# Patient Record
Sex: MALE | Race: WHITE | HISPANIC OR LATINO | Employment: FULL TIME | URBAN - METROPOLITAN AREA
[De-identification: names, ages, dates, MRNs, and addresses within clinical notes are randomized per-mention and may not be internally consistent; named-entity substitution may affect disease eponyms.]

---

## 2018-12-19 ENCOUNTER — HOSPITAL ENCOUNTER (EMERGENCY)
Facility: MEDICAL CENTER | Age: 23
End: 2018-12-19
Attending: EMERGENCY MEDICINE
Payer: COMMERCIAL

## 2018-12-19 ENCOUNTER — OCCUPATIONAL MEDICINE (OUTPATIENT)
Dept: URGENT CARE | Facility: CLINIC | Age: 23
End: 2018-12-19
Payer: COMMERCIAL

## 2018-12-19 VITALS
HEART RATE: 81 BPM | TEMPERATURE: 97.7 F | SYSTOLIC BLOOD PRESSURE: 146 MMHG | DIASTOLIC BLOOD PRESSURE: 81 MMHG | HEIGHT: 67 IN | WEIGHT: 210.32 LBS | RESPIRATION RATE: 16 BRPM | BODY MASS INDEX: 33.01 KG/M2 | OXYGEN SATURATION: 98 %

## 2018-12-19 VITALS
OXYGEN SATURATION: 98 % | TEMPERATURE: 98.8 F | RESPIRATION RATE: 16 BRPM | SYSTOLIC BLOOD PRESSURE: 132 MMHG | HEART RATE: 72 BPM | DIASTOLIC BLOOD PRESSURE: 94 MMHG

## 2018-12-19 DIAGNOSIS — T15.92XA FB EYE, LEFT, INITIAL ENCOUNTER: ICD-10-CM

## 2018-12-19 DIAGNOSIS — T15.92XA ACUTE FOREIGN BODY OF LEFT EYE, INITIAL ENCOUNTER: ICD-10-CM
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
## 2018-12-19 PROCEDURE — 99214 OFFICE O/P EST MOD 30 MIN: CPT | Mod: 29 | Performed by: NURSE PRACTITIONER

## 2018-12-19 PROCEDURE — 700101 HCHG RX REV CODE 250: Performed by: EMERGENCY MEDICINE

## 2018-12-19 PROCEDURE — 99284 EMERGENCY DEPT VISIT MOD MDM: CPT

## 2018-12-19 RX ORDER — TETRACAINE HYDROCHLORIDE 5 MG/ML
1 SOLUTION OPHTHALMIC ONCE
Status: COMPLETED | OUTPATIENT
Start: 2018-12-19 | End: 2018-12-19

## 2018-12-19 RX ORDER — ERYTHROMYCIN 5 MG/G
1 OINTMENT OPHTHALMIC 3 TIMES DAILY
Qty: 1 TUBE | Refills: 0 | Status: SHIPPED | OUTPATIENT
Start: 2018-12-19 | End: 2018-12-22

## 2018-12-19 RX ORDER — ERYTHROMYCIN 5 MG/G
OINTMENT OPHTHALMIC ONCE
Status: COMPLETED | OUTPATIENT
Start: 2018-12-19 | End: 2018-12-19

## 2018-12-19 RX ORDER — ERYTHROMYCIN 5 MG/G
1 OINTMENT OPHTHALMIC 3 TIMES DAILY
Qty: 1 TUBE | Refills: 0 | Status: SHIPPED | OUTPATIENT
Start: 2018-12-19 | End: 2018-12-19

## 2018-12-19 RX ADMIN — FLUORESCEIN SODIUM 0.6 MG: 0.6 STRIP OPHTHALMIC at 21:10

## 2018-12-19 RX ADMIN — ERYTHROMYCIN: 5 OINTMENT OPHTHALMIC at 21:14

## 2018-12-19 RX ADMIN — TETRACAINE HYDROCHLORIDE 1 DROP: 5 SOLUTION OPHTHALMIC at 21:10

## 2018-12-19 ASSESSMENT — ENCOUNTER SYMPTOMS
HEADACHES: 0
EYE REDNESS: 0
EYE PAIN: 0
PHOTOPHOBIA: 0
NECK PAIN: 0
EYE DISCHARGE: 0
NAUSEA: 0
DOUBLE VISION: 0
BLURRED VISION: 0

## 2018-12-19 ASSESSMENT — LIFESTYLE VARIABLES: DO YOU DRINK ALCOHOL: NO

## 2018-12-19 NOTE — LETTER
[facility], EMERGENCY DEPT   [address], [place] [address]  Phone: Dept: [phone] - Fax:        [facility] Progress Report and Disability Certification  Date of Service: 12/19/2018   No Show:  No  Date / Time of Next Visit:     Claim Information   Patient Name: Pacheco Hdz  Claim Number:     Employer: [employer]  Date of Injury:      Insurer / TPA:   ID / SSN: [id number]    Occupation:  Diagnosis: There were no encounter diagnoses.    Medical Information   Related to Industrial Injury?   ***   Subjective Complaints:      Objective Findings:     Pre-Existing Condition(s):     Assessment:        Status:    Permanent Disability:     Plan:      Diagnostics:      Comments:       Disability Information   Status:      From:     Through:   Restrictions are:     Physical Restrictions   Sitting:    Standing:    Stooping:    Bending:      Squatting:    Walking:    Climbing:    Pushing:      Pulling:    Other:    Reaching Above Shoulder (L):   Reaching Above Shoulder (R):       Reaching Below Shoulder (L):    Reaching Below Shoulder (R):      Not to exceed Weight Limits   Carrying(hrs):   Weight Limit(lb):   Lifting(hrs):   Weight  Limit(lb):     Comments:      Repetitive Actions   Hands: i.e. Fine Manipulations from Grasping:     Feet: i.e. Operating Foot Controls:     Driving / Operate Machinery:     Physician Name: Gemma Villarreal Physician Signature:   e-Signature:  , Medical Director   Clinic Name / Location: [facility], EMERGENCY DEPT  [address] [facility] [address] [phone]     Clinic Phone Number: Dept: [phone]   Appointment Time:  Visit Start Time:    Check-In Time:  4:59 PM Visit Discharge Time:    Original-Treating Physician or Chiropractor    Page 2-Insurer/TPA    Page 3-Employer    Page 4-Employee

## 2018-12-19 NOTE — LETTER
[facility], EMERGENCY DEPT   [address], [place] [address]  Phone: Dept: [phone] - Fax:        [facility] Progress Report and Disability Certification  Date of Service: 12/19/2018   No Show:  No  Date / Time of Next Visit:     Claim Information   Patient Name: Pacheco Hdz  Claim Number:     Employer: [employer]  Date of Injury: 12/19/2018     Insurer / TPA:   ID / SSN: [id number]    Occupation: Labor Diagnosis: The encounter diagnosis was Acute foreign body of left eye, initial encounter.    Medical Information   Related to Industrial Injury?   ***   Subjective Complaints:      Objective Findings:     Pre-Existing Condition(s):     Assessment:        Status:    Permanent Disability:     Plan:      Diagnostics:      Comments:       Disability Information   Status:      From:     Through:   Restrictions are:     Physical Restrictions   Sitting:    Standing:    Stooping:    Bending:      Squatting:    Walking:    Climbing:    Pushing:      Pulling:    Other:    Reaching Above Shoulder (L):   Reaching Above Shoulder (R):       Reaching Below Shoulder (L):    Reaching Below Shoulder (R):      Not to exceed Weight Limits   Carrying(hrs):   Weight Limit(lb):   Lifting(hrs):   Weight  Limit(lb):     Comments:      Repetitive Actions   Hands: i.e. Fine Manipulations from Grasping:     Feet: i.e. Operating Foot Controls:     Driving / Operate Machinery:     Physician Name: Gemma Villarreal Physician Signature:   e-Signature:  , Medical Director   Clinic Name / Location: [facility], EMERGENCY DEPT  [address] [place] [id number] [phone]     Clinic Phone Number: Dept: [phone]   Appointment Time:  Visit Start Time:    Check-In Time:  4:59 PM Visit Discharge Time:    Original-Treating Physician or Chiropractor    Page 2-Insurer/TPA    Page 3-Employer    Page 4-Employee

## 2018-12-19 NOTE — LETTER
[facility], EMERGENCY DEPT   [address] [place], [place] [address]  Phone: Dept: [phone] - Fax:        [facility] Progress Report and Disability Certification  Date of Service: 12/19/2018   No Show:  No  Date / Time of Next Visit:     Claim Information   Patient Name: Pacheco Hdz  Claim Number:     Employer: [employer]  Date of Injury: 12/19/2018     Insurer / TPA:   ID / SSN: [id number]    Occupation: Labor Diagnosis: The encounter diagnosis was Acute foreign body of left eye, initial encounter.    Medical Information   Related to Industrial Injury?   ***   Subjective Complaints:      Objective Findings:     Pre-Existing Condition(s):     Assessment:        Status:    Permanent Disability:     Plan:      Diagnostics:      Comments:       Disability Information   Status:      From:     Through:   Restrictions are:     Physical Restrictions   Sitting:    Standing:    Stooping:    Bending:      Squatting:    Walking:    Climbing:    Pushing:      Pulling:    Other:    Reaching Above Shoulder (L):   Reaching Above Shoulder (R):       Reaching Below Shoulder (L):    Reaching Below Shoulder (R):      Not to exceed Weight Limits   Carrying(hrs):   Weight Limit(lb):   Lifting(hrs):   Weight  Limit(lb):     Comments:      Repetitive Actions   Hands: i.e. Fine Manipulations from Grasping:     Feet: i.e. Operating Foot Controls:     Driving / Operate Machinery:     Physician Name: Gemma Villarreal Physician Signature:   e-Signature:  , Medical Director   Clinic Name / Location: [facility], EMERGENCY DEPT  [address] [place] [id number] [phone]     Clinic Phone Number: Dept: [phone]   Appointment Time:  Visit Start Time:    Check-In Time:  4:59 PM Visit Discharge Time:    Original-Treating Physician or Chiropractor    Page 2-Insurer/TPA    Page 3-Employer    Page 4-Employee

## 2018-12-19 NOTE — LETTER
EMPLOYEE’S CLAIM FOR COMPENSATION/ REPORT OF INITIAL TREATMENT  FORM C-4    EMPLOYEE’S CLAIM - PROVIDE ALL INFORMATION REQUESTED   First Name  Pacheco Last Name  Wilder Birthdate                    1995                Sex  male Claim Number   Home Address  [address] Age  23 y.o. 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 (Be specific an answer in detail. Use additional sheet if necessary)  While polishing metal resedue jumped up and hit the eye.   If you believe that you have an occupational disease, when did you first have knowledge of the disability and it relationship to your employment?  na Witnesses to the Accident  no one      Nature of Injury or Occupational Disease  Defer  Part(s) of Body Injured or Affected  Eye (L), ,     I certify that the above is true and correct to the best of my knowledge and that I have provided this information in order to obtain the benefits of [place] or [place]).  I hereby authorize any physician, chiropractor, surgeon, practitioner, or other person, any hospital, including [facility] Health system hospital, any medical service organization, any insurance company, or other institution or organization to release to each other, any medical or other information, including benefits paid or payable, pertinent to this injury or disease, except information relative to diagnosis, treatment and/or counseling for AIDS, psychological conditions, alcohol or controlled substances, for which I must give specific authorization.  A Photostat of this authorization shall be as valid as the original.     Date   Place   Employee’s Signature   THIS REPORT MUST BE COMPLETED AND MAILED WITHIN 3 WORKING DAYS OF TREATMENT   Place  [facility]  Name of Facility  [facility]   Date  12/19/2018 Diagnosis  (T15.92XA) FB eye, left, initial encounter Is there evidence the injured employee was under the influence of alcohol and/or another controlled substance at the time of accident?   Hour  3:56 PM Description of Injury or Disease  The encounter diagnosis was FB eye, left, initial encounter. No   Treatment  To ER for further evaluation and management.   Have you advised the patient to remain off work five days or more? No   X-Ray Findings      If Yes   From Date  To Date      From information given by the employee, together with medical evidence, can you directly connect this injury or occupational disease as job incurred?  Yes If No Full Duty  No Modified Duty  Yes   Is additional medical care by a physician indicated?  Yes If Modified Duty, Specify any Limitations / Restrictions  To be determined by ER physcian after evaluation   Do you know of any previous injury or disease contributing to this condition or occupational disease?                            No   Date  12/19/2018 Print Doctor’s Name Alisson [place]  "VIK Delgado I certify the employer’s copy of  this form was mailed on:   Address  [address] Insurer’s Use Only     [place] Zip  [address]    Provider’s Tax ID Number  [id number] Telephone  Dept: [phone]        [id number] Swenson   e-Signature: Dr. Fady Roth, Medical Director Degree  APN        ORIGINAL-TREATING PHYSICIAN OR CHIROPRACTOR    PAGE 2-INSURER/TPA    PAGE 3-EMPLOYER    PAGE 4-EMPLOYEE             Form C-4 (rev10/07)              BRIEF DESCRIPTION OF RIGHTS AND BENEFITS  (Pursuant to [place] 616C.050)    Notice of Injury or Occupational Disease (Incident Report Form C-1): If an injury or occupational disease (OD) arises out of and in the  course of employment, you must provide written notice to your employer as soon as practicable, but no later than 7 days after the accident or  OD. Your employer shall maintain a sufficient supply of the required forms.    Claim for Compensation (Form C-4): If medical treatment is sought, the form C-4 is available at the place of initial treatment. A completed  \"Claim for Compensation\" (Form C-4) must be filed within 90 days after an accident or OD. The treating physician or chiropractor must,  within 3 working days after treatment, complete and mail to the employer, the employer's insurer and third-party , the Claim for  Compensation.    Medical Treatment: If you require medical treatment for your on-the-job injury or OD, you may be required to select a physician or  chiropractor from a list provided by your workers’ compensation insurer, if it has contracted with an Organization for Managed Care (MCO) or  Preferred Provider Organization (PPO) or providers of health care. If your employer has not entered into a contract with an MCO or PPO, you  may select a physician or chiropractor from the Panel of Physicians and Chiropractors. Any medical costs related to your industrial injury or  OD " will be paid by your insurer.    Temporary Total Disability (TTD): If your doctor has certified that you are unable to work for a period of at least 5 consecutive days, or 5  cumulative days in a 20-day period, or places restrictions on you that your employer does not accommodate, you may be entitled to TTD  compensation.    Temporary Partial Disability (TPD): If the wage you receive upon reemployment is less than the compensation for TTD to which you are  entitled, the insurer may be required to pay you TPD compensation to make up the difference. TPD can only be paid for a maximum of 24  months.    Permanent Partial Disability (PPD): When your medical condition is stable and there is an indication of a PPD as a result of your injury or  OD, within 30 days, your insurer must arrange for an evaluation by a rating physician or chiropractor to determine the degree of your PPD. The  amount of your PPD award depends on the date of injury, the results of the PPD evaluation and your age and wage.    Permanent Total Disability (PTD): If you are medically certified by a treating physician or chiropractor as permanently and totally disabled  and have been granted a PTD status by your insurer, you are entitled to receive monthly benefits not to exceed 66 2/3% of your average  monthly wage. The amount of your PTD payments is subject to reduction if you previously received a PPD award.    Vocational Rehabilitation Services: You may be eligible for vocational rehabilitation services if you are unable to return to the job due to a  permanent physical impairment or permanent restrictions as a result of your injury or occupational disease.    Transportation and Per Edilia Reimbursement: You may be eligible for travel expenses and per edilia associated with medical treatment.    Reopening: You may be able to reopen your claim if your condition worsens after claim closure.    Appeal Process: If you disagree with a written determination  issued by the insurer or the insurer does not respond to your request, you may  appeal to the Department of Administration, , by following the instructions contained in your determination letter. You must  appeal the determination within 70 days from the date of the determination letter at [address], [address], [place], [place]  [address], or [address], [address], [place], [place] [address]. If you disagree with the  decision, you may appeal to the  Department of Administration, . You must file your appeal within 30 days from the date of the  decision  letter at [address], [address], [place], [place] [address], or [address], [address], [place], [place] [address]. If you  disagree with a decision of an , you may file a petition for judicial review with the District Court. You must do so within 30  days of the Appeal Officer’s decision. You may be represented by an  at your own expense or you may contact the [facility] for possible  representation.    [place]  for Injured Workers (NAIW): If you disagree with a  decision, you may request that NAIW represent you  without charge at an  Hearing. For information regarding denial of benefits, you may contact the [facility] at: [address], [address], [place] [address], [phone], or [address], [place] [address], [phone]    To File a Complaint with the Division: If you wish to file a complaint with the  of the Division of Industrial Relations (DIR),  please contact the Workers’ Compensation Section, [address], [address], [place], [place] [address], telephone ([phone]) [phone], or  [address], [place] [address], telephone [phone].    For assistance with Workers’ Compensation Issues: you may contact the Office of the  [facility] Consumer Health Assistance, [address], [address], [place] [address], Toll Free 1-331.588.6762, Web site: http://govcha.[facility].nv., E-mail  [email or web address].                                                                                                                                                                                                                                   __________________________________________________________________                                                                   _________________                Employee Name / Signature                                                                                                                                                       Date                                                                                                                                                                                                     D-2 (rev. 10/07)

## 2018-12-19 NOTE — LETTER
[facility] [address] [place] [address]  Phone:  [phone] - Fax:  [phone]   [facility] Progress Report and Disability Certification  Date of Service: 12/19/2018   No Show:  No  Date / Time of Next Visit:     Claim Information   Patient Name: Pacheco Hdz  Claim Number:     Employer: [employer]  Date of Injury: 12/19/2018     Insurer / TPA: [employer] [place]  ID / SSN:     Occupation: Labor  Diagnosis: The encounter diagnosis was FB eye, left, initial encounter.    Medical Information   Related to Industrial Injury? Yes    Subjective Complaints:  DOI 12/19/18 @ 11:30. He was polishing a metal hollis with a  brush. The metal particles came up and his his eye. He had a sensation of something in his eye. He used OTC eye drops in Left eye. The eye drops helped remove some of the flakes. He still has the sensation that something is in his eye. He can see it in his vision field.  Pain 0/10. Vision is normal. He does not wear contacts or glasses. He was wearing his safety googles at the time of the incident.  He does not have any other employers.    Objective Findings: COURTNEY. EOM intact. Visual acuity intact 20/15 OU. There is a small metal FB at medial aspect of pupil in cornea near edge of pupil.    Pre-Existing Condition(s):     Assessment:   Initial Visit    Status: Additional Care Required  Comments:TO ER for further evalatuion and management  Permanent Disability:No    Plan:      Diagnostics:      Comments:       Disability Information   Status:   Comments:to be determined by ER     From:     Through:   Restrictions are:     Physical Restrictions   Sitting:    Standing:    Stooping:    Bending:      Squatting:    Walking:    Climbing:    Pushing:      Pulling:    Other:    Reaching Above Shoulder (L):   Reaching Above Shoulder (R):       Reaching Below Shoulder (L):    Reaching Below Shoulder (R):      Not to exceed Weight  Limits   Carrying(hrs):   Weight Limit(lb):   Lifting(hrs):   Weight  Limit(lb):     Comments:      Repetitive Actions   Hands: i.e. Fine Manipulations from Grasping:     Feet: i.e. Operating Foot Controls:     Driving / Operate Machinery:     Physician Name: VIK Giordano Physician Signature: NARCISO Greer e-Signature: Dr. Fady Roth, Medical Director   Clinic Name / Location: [address] [address] Clinic Phone Number: Dept: [phone]   Appointment Time: 3:00 Pm Visit Start Time: 3:56 PM   Check-In Time:  3:12 Pm Visit Discharge Time:  4:49 PM   Original-Treating Physician or Chiropractor    Page 2-Insurer/TPA    Page 3-Employer    Page 4-Employee

## 2018-12-19 NOTE — LETTER
[facility], EMERGENCY DEPT   [address], [place] [address]  Phone: Dept: [phone] - Fax:        [facility] Progress Report and Disability Certification  Date of Service: 12/19/2018   No Show:  No  Date / Time of Next Visit:     Claim Information   Patient Name: Pacheco Hdz  Claim Number:     Employer: [employer]  Date of Injury: 12/19/2018     Insurer / TPA:  [employer] ID / SSN:    Occupation: Labor Diagnosis: The encounter diagnosis was Acute foreign body of left eye, initial encounter.    Medical Information   Related to Industrial Injury? Yes ***   Subjective Complaints:      Objective Findings: Foreign body in left eye   Pre-Existing Condition(s):     Assessment:   Initial Visit    Status: Additional Care Required  Permanent Disability:  Comments:follow up at occupational health    Plan: Consultation    Diagnostics:   Comments:none    Comments:       Disability Information   Status:   Comments:To be determined by occupational health    From:     Through:   Restrictions are:     Physical Restrictions   Sitting:    Standing:    Stooping:    Bending:      Squatting:    Walking:    Climbing:    Pushing:      Pulling:    Other:    Reaching Above Shoulder (L):   Reaching Above Shoulder (R):       Reaching Below Shoulder (L):    Reaching Below Shoulder (R):      Not to exceed Weight Limits   Carrying(hrs):   Weight Limit(lb):   Lifting(hrs):   Weight  Limit(lb):     Comments:      Repetitive Actions   Hands: i.e. Fine Manipulations from Grasping:     Feet: i.e. Operating Foot Controls:     Driving / Operate Machinery:     Physician Name: Julianne Villarreal Physician Signature: JULIANNE Casillas M.D. e-Signature:  , Medical Director   Clinic Name / Location: [facility], EMERGENCY DEPT  [address] [facility] [address] [facility] [address]  [phone]     Clinic Phone Number: Dept:  [phone]   Appointment Time:  Visit Start Time:    Check-In Time:  4:59 PM Visit Discharge Time:    Original-Treating Physician or Chiropractor    Page 2-Insurer/TPA    Page 3-Employer    Page 4-Employee

## 2018-12-20 NOTE — ED TRIAGE NOTES
"22 y/o male ambulatory to triage with c/o metal foreign body to the left eye. Pt states this occurred today while at work. Pt denies pain, he states there is \"a little\" blurred vision in the eye. Sent by urgent care.   "

## 2018-12-20 NOTE — ED PROVIDER NOTES
"ED Provider Note    Chief Complaint:   Foreign body in the eye    HPI:  Pacheco Hdz is a 23 y.o. male who presents with chief complaint of left eye foreign body.  His symptoms began to 3 hours prior to arrival.  He was using a  when he felt a small flake of metal fly into his eye.  He can see the small piece of metal and describe some associated eye irritation.  He has no vision changes, no other injuries today.  This did occur at work.  He reports no other concerns at this time.    Review of Systems:  See HPI for pertinent positives and negatives.    Past Medical History:       Social History:  Social History     Social History Main Topics   • Smoking status: Never Smoker   • Smokeless tobacco: Never Used   • Alcohol use Not on file   • Drug use: Unknown   • Sexual activity: Not on file       Surgical History:  patient denies any surgical history    Current Medications:  Home Medications    **Home medications have not yet been reviewed for this encounter**         Allergies:  No Known Allergies    Physical Exam:  Vital Signs: /81   Pulse 81   Temp 36.5 °C (97.7 °F) (Temporal)   Resp 16   Ht 1.702 m (5' 7\")   Wt 95.4 kg (210 lb 5.1 oz)   SpO2 98%   BMI 32.94 kg/m²   Constitutional: Alert, no acute distress  HENT: Atraumatic  Eyes: Right eye is normal-appearing, left eye with very mildly reddened conjunctiva, punctate metal flake seen in the 3 o'clock position.  Pulmonary: No respiratory distress, normal work of breathing  Skin: Warm, dry, no rashes or lesions, no periorbital edema  Neurologic: Alert, oriented, normal speech, normal motor function  Psychiatric: Normal and appropriate mood and affect    Medical records reviewed for continuity of care.  No recent visits for similar symptoms.    ED Medications Administered:  Medications   tetracaine (PONTOCAINE) 0.5 % ophthalmic solution 1 Drop (1 Drop Left Eye Given 12/19/18 2110)   erythromycin ophthalmic ointment ( Left Eye Given " 12/19/18 2114)   fluorescein ophthalmic strip 0.6 mg (0.6 mg Both Eyes Given 12/19/18 2110)       MDM:  Patient presents with small metallic foreign body in the left eye.  Eye was anesthetized using tetracaine drops.  Despite good anesthesia, patient is unable to remain still for foreign body removal, continually looking away when I approach the eye with any type of instrument.  Additionally, he backs away from the slit lamp when I approach.  I made multiple attempts at removing this foreign body, ultimately I was unable to get near the eye for removal as the patient states he is just not able to remain still.  Plan at this time is for discharge home.  He is given follow-up information for Dr. Parr, ophthalmologist on call this evening.  He will follow-up in clinic tomorrow for foreign body removal.  Additionally, he is instructed to follow-up with occupational health for work restrictions.  If he is not able to follow-up tomorrow for foreign body removal, he will return to the emergency department during business hours for assistance with ophthalmology follow-up appointment.  Return precautions given including development of worsening pain, vision changes, drainage or discharge from the eye, or any further concerns.    7:48 PM Awaiting medications    Blood pressure today is greater than 120/80, patient is instructed to follow up with primary care provider for blood pressure recheck.    Disposition:  Discharged home in stable condition    Final Impression:  1. Acute foreign body of left eye, initial encounter        Electronically signed by: Gemma Villarreal, 12/19/2018 12:40 AM

## 2018-12-20 NOTE — DISCHARGE INSTRUCTIONS
Please follow-up with the ophthalmologist, call tomorrow morning to schedule that appointment.  Return to the emergency department tomorrow morning if you are not able to follow-up with ophthalmology or occupational health.  Please follow-up with occupational health tomorrow for complete recheck.  Return immediately if you develop new or worsening symptoms including worsening pain, redness, drainage from the eye, or any further concerns.

## 2018-12-20 NOTE — ED NOTES
This RN brought patient back from waiting room. Pt is alert, oriented and ambulatory with steady gait. Pt accompanied by girlfriend.

## 2018-12-20 NOTE — PROGRESS NOTES
Subjective:      Pacheco Hdz is a 23 y.o. male who presents with Eye Injury (left eye, wire went into his left eye. NO blurry vision. )      Denies past medical, surgical or family history that is significant to today's problem.   RX or OTC medications reviewed with patient today.   No Known Allergies         HPI DOI 12/19/18 @ 11:30. He was polishing a metal hollis with a  brush. The metal particles came up and his his eye. He had a sensation of something in his eye. He used OTC eye drops in Left eye. The eye drops helped remove some of the flakes. He still has the sensation that something is in his eye. He can see it in his vision field.  Pain 0/10. Vision is normal. He does not wear contacts or glasses. He was wearing his safety googles at the time of the incident.  He does not have any other employers.     Review of Systems   Eyes: Negative for blurred vision, double vision, photophobia, pain, discharge and redness.   Gastrointestinal: Negative for nausea.   Musculoskeletal: Negative for neck pain.   Neurological: Negative for headaches.          Objective:     /94   Pulse 72   Temp 37.1 °C (98.8 °F) (Temporal)   Resp 16   SpO2 98%      Physical Exam   Constitutional: Vital signs are normal. He appears well-developed and well-nourished. He is cooperative.  Non-toxic appearance. No distress.   HENT:   Head: Normocephalic.   Eyes: Pupils are equal, round, and reactive to light. EOM and lids are normal. Lids are everted and swept, no foreign bodies found. Right eye exhibits discharge. Right conjunctiva is injected.       Cardiovascular: Normal rate and regular rhythm.    Pulmonary/Chest: Effort normal. No respiratory distress.   Neurological: He is alert.   Skin: Skin is warm and dry.   Psychiatric: He has a normal mood and affect. His speech is normal and behavior is normal.   Nursing note and vitals reviewed.      COURTNEY. EOM intact. Visual acuity intact 20/15 OU. There is a small metal FB at  medial aspect of pupil in cornea near edge of pupil.        Assessment/Plan:     1. FB eye, left, initial encounter      2. Work related injury      [facility] called to arrange transfer to higher level of care. Report given to dr. franz  Pt is to be transported via pvt car.   I have reiterated to patient that although a provider to provider transfer was made this will not necessarily expedite the ER process

## 2019-01-22 ENCOUNTER — APPOINTMENT (OUTPATIENT)
Dept: RADIOLOGY | Facility: MEDICAL CENTER | Age: 24
End: 2019-01-22
Attending: EMERGENCY MEDICINE
Payer: OTHER MISCELLANEOUS

## 2019-01-22 ENCOUNTER — HOSPITAL ENCOUNTER (EMERGENCY)
Facility: MEDICAL CENTER | Age: 24
End: 2019-01-22
Attending: EMERGENCY MEDICINE
Payer: OTHER MISCELLANEOUS

## 2019-01-22 ENCOUNTER — NON-PROVIDER VISIT (OUTPATIENT)
Dept: OCCUPATIONAL MEDICINE | Facility: CLINIC | Age: 24
End: 2019-01-22
Payer: OTHER MISCELLANEOUS

## 2019-01-22 VITALS
RESPIRATION RATE: 16 BRPM | TEMPERATURE: 98.4 F | HEIGHT: 67 IN | BODY MASS INDEX: 33.32 KG/M2 | HEART RATE: 75 BPM | WEIGHT: 212.3 LBS | SYSTOLIC BLOOD PRESSURE: 118 MMHG | OXYGEN SATURATION: 95 % | DIASTOLIC BLOOD PRESSURE: 80 MMHG

## 2019-01-22 DIAGNOSIS — T14.8XXA PUNCTURE WOUND: ICD-10-CM

## 2019-01-22 DIAGNOSIS — Z02.83 ENCOUNTER FOR DRUG SCREENING: ICD-10-CM

## 2019-01-22 DIAGNOSIS — M79.5 FOREIGN BODY (FB) IN SOFT TISSUE: ICD-10-CM

## 2019-01-22 DIAGNOSIS — Z02.1 PRE-EMPLOYMENT DRUG SCREENING: ICD-10-CM

## 2019-01-22 LAB
AMP AMPHETAMINE: NORMAL
BREATH ALCOHOL COMMENT: NORMAL
COC COCAINE: NORMAL
INT CON NEG: NORMAL
INT CON POS: NORMAL
MET METHAMPHETAMINES: NORMAL
OPI OPIATES: NORMAL
PCP PHENCYCLIDINE: NORMAL
POC BREATHALIZER: 0 PERCENT (ref 0–0.01)
POC DRUG COMMENT 753798-OCCUPATIONAL HEALTH: NEGATIVE
THC: NORMAL

## 2019-01-22 PROCEDURE — 304217 HCHG IRRIGATION SYSTEM

## 2019-01-22 PROCEDURE — 96365 THER/PROPH/DIAG IV INF INIT: CPT

## 2019-01-22 PROCEDURE — 10120 INC&RMVL FB SUBQ TISS SMPL: CPT

## 2019-01-22 PROCEDURE — 73130 X-RAY EXAM OF HAND: CPT | Mod: RT

## 2019-01-22 PROCEDURE — 80305 DRUG TEST PRSMV DIR OPT OBS: CPT | Performed by: INTERNAL MEDICINE

## 2019-01-22 PROCEDURE — 96375 TX/PRO/DX INJ NEW DRUG ADDON: CPT

## 2019-01-22 PROCEDURE — 82075 ASSAY OF BREATH ETHANOL: CPT | Performed by: INTERNAL MEDICINE

## 2019-01-22 PROCEDURE — 700105 HCHG RX REV CODE 258: Performed by: EMERGENCY MEDICINE

## 2019-01-22 PROCEDURE — 700111 HCHG RX REV CODE 636 W/ 250 OVERRIDE (IP)

## 2019-01-22 PROCEDURE — 99026 IN-HOSPITAL ON CALL SERVICE: CPT | Performed by: INTERNAL MEDICINE

## 2019-01-22 PROCEDURE — 700111 HCHG RX REV CODE 636 W/ 250 OVERRIDE (IP): Performed by: EMERGENCY MEDICINE

## 2019-01-22 PROCEDURE — 99285 EMERGENCY DEPT VISIT HI MDM: CPT

## 2019-01-22 PROCEDURE — 94770 HCHG CO2 EXPIRED GAS DETERMINATION: CPT

## 2019-01-22 PROCEDURE — 700101 HCHG RX REV CODE 250: Performed by: EMERGENCY MEDICINE

## 2019-01-22 RX ORDER — LIDOCAINE HYDROCHLORIDE 10 MG/ML
20 INJECTION, SOLUTION INFILTRATION; PERINEURAL ONCE
Status: COMPLETED | OUTPATIENT
Start: 2019-01-22 | End: 2019-01-22

## 2019-01-22 RX ORDER — PROPOFOL 10 MG/ML
INJECTION, EMULSION INTRAVENOUS
Status: COMPLETED | OUTPATIENT
Start: 2019-01-22 | End: 2019-01-22

## 2019-01-22 RX ORDER — SODIUM CHLORIDE 9 MG/ML
INJECTION, SOLUTION INTRAVENOUS
Status: COMPLETED | OUTPATIENT
Start: 2019-01-22 | End: 2019-01-22

## 2019-01-22 RX ORDER — OXYCODONE HYDROCHLORIDE AND ACETAMINOPHEN 5; 325 MG/1; MG/1
1-2 TABLET ORAL EVERY 6 HOURS PRN
Qty: 15 TAB | Refills: 0 | Status: SHIPPED | OUTPATIENT
Start: 2019-01-22 | End: 2019-01-25

## 2019-01-22 RX ORDER — CEPHALEXIN 500 MG/1
500 CAPSULE ORAL 4 TIMES DAILY
Qty: 28 CAP | Refills: 0 | Status: SHIPPED | OUTPATIENT
Start: 2019-01-22 | End: 2019-01-29

## 2019-01-22 RX ORDER — CEFAZOLIN SODIUM 1 G/50ML
1 INJECTION, SOLUTION INTRAVENOUS ONCE
Status: COMPLETED | OUTPATIENT
Start: 2019-01-22 | End: 2019-01-22

## 2019-01-22 RX ADMIN — FENTANYL CITRATE 50 MCG: 50 INJECTION, SOLUTION INTRAMUSCULAR; INTRAVENOUS at 13:05

## 2019-01-22 RX ADMIN — CEFAZOLIN SODIUM 1 G: 1 INJECTION, SOLUTION INTRAVENOUS at 14:01

## 2019-01-22 RX ADMIN — PROPOFOL 60 MG: 10 INJECTION, EMULSION INTRAVENOUS at 13:38

## 2019-01-22 RX ADMIN — PROPOFOL 20 MG: 10 INJECTION, EMULSION INTRAVENOUS at 13:44

## 2019-01-22 RX ADMIN — PROPOFOL 40 MG: 10 INJECTION, EMULSION INTRAVENOUS at 13:43

## 2019-01-22 RX ADMIN — SODIUM CHLORIDE 1000 ML: 9 INJECTION, SOLUTION INTRAVENOUS at 13:37

## 2019-01-22 RX ADMIN — LIDOCAINE HYDROCHLORIDE 20 ML: 10 INJECTION, SOLUTION INFILTRATION; PERINEURAL at 12:26

## 2019-01-22 RX ADMIN — PROPOFOL 40 MG: 10 INJECTION, EMULSION INTRAVENOUS at 13:39

## 2019-01-22 RX ADMIN — FENTANYL CITRATE 100 MCG: 50 INJECTION, SOLUTION INTRAMUSCULAR; INTRAVENOUS at 12:40

## 2019-01-22 RX ADMIN — PROPOFOL 20 MG: 10 INJECTION, EMULSION INTRAVENOUS at 13:41

## 2019-01-22 ASSESSMENT — PAIN SCALES - GENERAL
PAINLEVEL_OUTOF10: 6
PAINLEVEL_OUTOF10: 10

## 2019-01-22 NOTE — LETTER
"  FORM C-4:  EMPLOYEE’S CLAIM FOR COMPENSATION/ REPORT OF INITIAL TREATMENT  EMPLOYEE’S CLAIM - PROVIDE ALL INFORMATION REQUESTED   First Name  Pacheco Last Name  Wilder Birthdate             Age  1995 23 y.o. Sex  male Claim Number   Home Employee Address  [address]  [place]                                     [place]  [address] Height  1.702 m (5' 7\") Weight  96.3 kg (212 lb 4.9 oz) [facility]     Mailing Employee Address                           [address]   [place]               Zip  [address] Telephone  [phone] (home)  Primary Language Spoken  ENGLISH   Insurer   Third Party   [employer] CLAIMS Employee's Occupation (Job Title) When Injury or Occupational Disease Occurred     Employer's Name  [employer] Telephone  [phone]    Employer Address  [address] [facility] [address]] Zip  [address]   Date of Injury  1/22/2019       Hour of Injury  11:00 AM Date Employer Notified  1/22/2019 Last Day of Work after Injury or Occupational Disease  1/22/2019 Supervisor to Whom Injury Reported  Pineda   Address or Location of Accident (if applicable)  [[address] [place] [address] Dr]   What were you doing at the time of accident? (if applicable)  Was Framing a wall    How did this injury or occupational disease occur? Be specific and answer in detail. Use additional sheet if necessary)  was framing a wall and a nailwent through my hand   If you believe that you have an occupational disease, when did you first have knowledge of the disability and it relationship to your employment?  NA Witnesses to the Accident  NA     Nature of Injury or Occupational Disease  Workers' Compensation  Part(s) of Body Injured or Affected  Hand (R), N/A, N/A    I certify that the above is true and correct to the best of my knowledge and that I have provided this information in order to obtain the benefits of [place] inclusive or [place] of [place]).  I hereby authorize any physician, chiropractor, surgeon, practitioner, or other person, any hospital, including [facility] or [facility] hospital, any medical service organization, any insurance company, or other institution or organization to release to each other, any medical or other information, including benefits paid or payable, pertinent to this injury or disease, except information relative to diagnosis, treatment and/or counseling for AIDS, psychological conditions, alcohol or controlled substances, for which I must give specific authorization.  A Photostat of this authorization shall be as valid as the original.   Date  01/22/2019 Place  [facility] Employee’s Signature   THIS REPORT MUST BE COMPLETED AND MAILED WITHIN 3 WORKING DAYS OF TREATMENT   Place  [facility], EMERGENCY DEPT  Name of Facility   [facility]   Date  1/22/2019 Diagnosis  (M79.5) Foreign body (FB) in soft tissue  (T14.8XXA) Puncture wound Is there evidence the injured employee was under the influence of alcohol and/or another controlled substance at the time of accident?   Hour  4:27 PM Description of Injury or Disease  Foreign body (FB) in soft tissue  Puncture wound No   Treatment  Removal of foreign body, wound care, antibiotics, pain medication  Have you advised the patient to remain off work five days or more?         No   X-Ray Findings  Positive   If Yes   From Date    To Date      From information given by the employee, together with medical evidence, can you directly connect this injury or occupational disease as job incurred?  Yes If No, is the employee capable of: Full Duty  No Modified Duty  Yes   Is additional medical care by a physician indicated?  Yes If Modified Duty, Specify any Limitations / Restrictions  No use of right hand for 2 days until follow-up     Do you know of any previous injury or disease  "contributing to this condition or occupational disease?  No   Date  1/22/2019 Print Doctor’s Name  Iliana Benitez I certify the employer’s copy of this form was mailed on:   Address  [address] [facility] [address] [facility] [address] [phone] Insurer’s Use Only   [facility]  [address]    Provider’s Tax ID Number    Telephone  Dept: [phone]    Doctor’s Signature  e-ILIANA Koenig M.D. Degree   M.D.    Original - TREATING PHYSICIAN OR CHIROPRACTOR   Pg 2-Insurer/TPA   Pg 3-Employer   Pg 4-Employee                                                                                                  Form C-4 (rev01/03)   BRIEF DESCRIPTION OF RIGHTS AND BENEFITS  (Pursuant to [place]RS 616C.050)  Notice of Injury or Occupational Disease (Incident Report Form C-1): If an injury or occupational disease (OD) arises out of and in the course of employment, you must provide written notice to your employer as soon as practicable, but no later than 7 days after the accident or OD. Your employer shall maintain a sufficient supply of the required forms.  Claim for Compensation (Form C-4): If medical treatment is sought, the form C-4 is available at the place of initial treatment. A completed \"Claim for Compensation\" (Form C-4) must be filed within 90 days after an accident or OD. The treating physician or chiropractor must, within 3 working days after treatment, complete and mail to the employer, the employer's insurer and third-party , the Claim for Compensation.  Medical Treatment: If you require medical treatment for your on-the-job injury or OD, you may be required to select a physician or chiropractor from a list provided by your workers’ compensation insurer, if it has contracted with an Organization for Managed Care (MCO) or Preferred Provider Organization (PPO) or providers of health care. If your employer has not entered into a contract with an MCO or PPO, you may select a physician or " chiropractor from the Panel of Physicians and Chiropractors. Any medical costs related to your industrial injury or OD will be paid by your insurer.  Temporary Total Disability (TTD): If your doctor has certified that you are unable to work for a period of at least 5 consecutive days, or 5 cumulative days in a 20-day period, or places restrictions on you that your employer does not accommodate, you may be entitled to TTD compensation.  Temporary Partial Disability (TPD): If the wage you receive upon reemployment is less than the compensation for TTD to which you are entitled, the insurer may be required to pay you TPD compensation to make up the difference. TPD can only be paid for a maximum of 24 months.  Permanent Partial Disability (PPD): When your medical condition is stable and there is an indication of a PPD as a result of your injury or OD, within 30 days, your insurer must arrange for an evaluation by a rating physician or chiropractor to determine the degree of your PPD. The amount of your PPD award depends on the date of injury, the results of the PPD evaluation and your age and wage.  Permanent Total Disability (PTD): If you are medically certified by a treating physician or chiropractor as permanently and totally disabled and have been granted a PTD status by your insurer, you are entitled to receive monthly benefits not to exceed 66 2/3% of your average monthly wage. The amount of your PTD payments is subject to reduction if you previously received a PPD award.  Vocational Rehabilitation Services: You may be eligible for vocational rehabilitation services if you are unable to return to the job due to a permanent physical impairment or permanent restrictions as a result of your injury or occupational disease.  Transportation and Per Edilia Reimbursement: You may be eligible for travel expenses and per edilia associated with medical treatment.  Reopening: You may be able to reopen your claim if your condition  worsens after claim closure.  Appeal Process: If you disagree with a written determination issued by the insurer or the insurer does not respond to your request, you may appeal to the Department of Administration, , by following the instructions contained in your determination letter. You must appeal the determination within 70 days from the date of the determination letter at [address], [address], [place], [place] [address], or [address], [place], [place] [address]. If you disagree with the  decision, you may appeal to the Department of Administration, . You must file your appeal within 30 days from the date of the  decision letter at [address], [address], [place], [place] [address], or [address], [address], [place], [place] [address]. If you disagree with a decision of an , you may file a petition for judicial review with the District Court. You must do so within 30 days of the Appeal Officer’s decision. You may be represented by an  at your own expense or you may contact the [facility] for possible representation.  [place]  for Injured Workers (NAIW): If you disagree with a  decision, you may request that NAIW represent you without charge at an  Hearing. For information regarding denial of benefits, you may contact the [facility] at: [address], [address], [place] [address], [phone], or [address], [place] [address], [phone]  To File a Complaint with the Division: If you wish to file a complaint with the  of the Division of Industrial Relations (DIR), please contact the Workers’ Compensation Section, [address] [place] [address], [place], [place] [address], telephone [phone], or [address], [place] [address], telephone [phone].  For  assistance with Workers’ Compensation Issues: you may contact the Office of the Governor Consumer Health Assistance, [address] [place] [address], [address], [place] [address], Toll Free 1-834.747.8443, Web site: http://govcha.[facility].nv., E-mail [email or web address].                                                                                                                                                                               __________________________________________________________________                                    01/22/2019            Employee Name / Signature                                                                                                                            Date                                       D-2 (rev. 10/07)

## 2019-01-22 NOTE — LETTER
"  FORM C-4:  EMPLOYEE’S CLAIM FOR COMPENSATION/ REPORT OF INITIAL TREATMENT  EMPLOYEE’S CLAIM - PROVIDE ALL INFORMATION REQUESTED   First Name  Pacheco Last Name  Wilder Birthdate             Age  1995 23 y.o. Sex  male Claim Number   Home Employee Address  [address]  [facility]                                     Zip  [address] Height  1.702 m (5' 7\") Weight  96.3 kg (212 lb 4.9 oz) [facility]     Mailing Employee Address                           [address]   [facility]               Zip  [address] Telephone  [phone] (home)  Primary Language Spoken  ENGLISH   Insurer   Third Party   [employer] CLAIMS Employee's Occupation (Job Title) When Injury or Occupational Disease      Employer's Name  [employer] Telephone  [phone]    Employer Address  [address] [facility] [address]] Zip  [address]   Date of Injury  1/22/2019       Hour of Injury  11:00 AM Date Employer Notified  1/22/2019 Last Day of Work after Injury or Occupational Disease  1/22/2019 Supervisor to Whom Injury Reported  Pineda   Address or Location of Accident (if applicable)  [[address] [place] [address] Dr]   What were you doing at the time of accident? (if applicable)  Was Framing a wall    How did this injury or occupational disease occur? Be specific and answer in detail. Use additional sheet if necessary)  was framing a wall and a nailwent through my hand   If you believe that you have an occupational disease, when did you first have knowledge of the disability and it relationship to your employment?  NA Witnesses to the Accident  NA     Nature of Injury or Occupational Disease  Workers' Compensation  Part(s) of Body Injured or Affected  Hand (R), N/A, N/A    I certify that the above is true and correct to the best of my knowledge and that I have provided this information in order to obtain the benefits of [place]).  I hereby authorize any physician, chiropractor, surgeon, practitioner, or other person, any hospital, including [facility] hospital, any medical service organization, any insurance company, or other institution or organization to release to each other, any medical or other information, including benefits paid or payable, pertinent to this injury or disease, except information relative to diagnosis, treatment and/or counseling for AIDS, psychological conditions, alcohol or controlled substances, for which I must give specific authorization.  A Photostat of this authorization shall be as valid as the original.   Date 01/24/2019 Place  [facility] Employee’s Signature   THIS REPORT MUST BE COMPLETED AND MAILED WITHIN 3 WORKING DAYS OF TREATMENT   Place  [facility], EMERGENCY DEPT  Name of Facility   [facility]   Date  1/22/2019 Diagnosis  (M79.5) Foreign body (FB) in soft tissue  (T14.8XXA) Puncture wound Is there evidence the injured employee was under the influence of alcohol and/or another controlled substance at the time of accident?   Hour  2:25 PM Description of Injury or Disease  Foreign body (FB) in soft tissue  Puncture wound No   Treatment  Removal of foreign body, wound care, antibiotics, pain medication  Have you advised the patient to remain off work five days or more?         No   X-Ray Findings  Positive   If Yes   From Date    To Date      From information given by the employee, together with medical evidence, can you directly connect this injury or occupational disease as job incurred?  Yes If No, is the employee capable of: Full Duty  No Modified Duty  Yes   Is additional medical care by a physician indicated?  Yes If Modified Duty, Specify any Limitations / Restrictions  No use of right hand for 2 days until follow-up     Do you know of any previous injury or disease  "contributing to this condition or occupational disease?  No   Date  1/24/2019 Print Doctor’s Name  Iliana Benitez I certify the employer’s copy of this form was mailed on: 01/24/19   Address  [address] [place] [address]  [place] [address] [phone] Insurer’s Use Only   [facility]  [id number]    Provider’s Tax ID Number   [id number] Telephone  Dept: [phone]    Doctor’s Signature  e-ILIANA Koenig M.D. Degree   MD    Original - TREATING PHYSICIAN OR CHIROPRACTOR   Pg 2-Insurer/TPA   Pg 3-Employer   Pg 4-Employee                                                                                                  Form C-4 (rev01/03)     BRIEF DESCRIPTION OF RIGHTS AND BENEFITS  (Pursuant to [place] 616C.050)    Notice of Injury or Occupational Disease (Incident Report Form C-1): If an injury or occupational disease (OD) arises out of and in the course of employment, you must provide written notice to your employer as soon as practicable, but no later than 7 days after the accident or OD. Your employer shall maintain a sufficient supply of the required forms.    Claim for Compensation (Form C-4): If medical treatment is sought, the form C-4 is available at the place of initial treatment. A completed \"Claim for Compensation\" (Form C-4) must be filed within 90 days after an accident or OD. The treating physician or chiropractor must, within 3 working days after treatment, complete and mail to the employer, the employer's insurer and third-party , the Claim for Compensation.    Medical Treatment: If you require medical treatment for your on-the-job injury or OD, you may be required to select a physician or chiropractor from a list provided by your workers’ compensation insurer, if it has contracted with an Organization for Managed Care (MCO) or Preferred Provider Organization (PPO) or providers of health care. If your employer has not entered into a contract with an MCO or PPO, you may select a " physician or chiropractor from the Panel of Physicians and Chiropractors. Any medical costs related to your industrial injury or OD will be paid by your insurer.    Temporary Total Disability (TTD): If your doctor has certified that you are unable to work for a period of at least 5 consecutive days, or 5 cumulative days in a 20-day period, or places restrictions on you that your employer does not accommodate, you may be entitled to TTD compensation.    Temporary Partial Disability (TPD): If the wage you receive upon reemployment is less than the compensation for TTD to which you are entitled, the insurer may be required to pay you TPD compensation to make up the difference. TPD can only be paid for a maximum of 24 months.    Permanent Partial Disability (PPD): When your medical condition is stable and there is an indication of a PPD as a result of your injury or OD, within 30 days, your insurer must arrange for an evaluation by a rating physician or chiropractor to determine the degree of your PPD. The amount of your PPD award depends on the date of injury, the results of the PPD evaluation and your age and wage.    Permanent Total Disability (PTD): If you are medically certified by a treating physician or chiropractor as permanently and totally disabled and have been granted a PTD status by your insurer, you are entitled to receive monthly benefits not to exceed 66 2/3% of your average monthly wage. The amount of your PTD payments is subject to reduction if you previously received a PPD award.    Vocational Rehabilitation Services: You may be eligible for vocational rehabilitation services if you are unable to return to the job due to a permanent physical impairment or permanent restrictions as a result of your injury or occupational disease.    Transportation and Per Edilia Reimbursement: You may be eligible for travel expenses and per edilia associated with medical treatment.  Reopening: You may be able to reopen  your claim if your condition worsens after claim closure.    Appeal Process: If you disagree with a written determination issued by the insurer or the insurer does not respond to your request, you may appeal to the Department of Administration, , by following the instructions contained in your determination letter. You must appeal the determination within 70 days from the date of the determination letter at [address], [address], [place], [place] [address], or [address] [facility], [address], [place], [place] [address]. If you disagree with the  decision, you may appeal to the Department of Administration, . You must file your appeal within 30 days from the date of the  decision letter at [address], [address], [place], [place] [address], or [address], [facility] [address], [place], [place] [address]. If you disagree with a decision of an , you may file a petition for judicial review with the District Court. You must do so within 30 days of the Appeal Officer’s decision. You may be represented by an  at your own expense or you may contact the [facility] for possible representation.    [place]  for Injured Workers (NAIW): If you disagree with a  decision, you may request that NAIW represent you without charge at an  Hearing. For information regarding denial of benefits, you may contact the [facility] at: [address], [address], [place] [address], [phone], or [address], [facility] [address], [place] [address], [phone]    To File a Complaint with the Division: If you wish to file a complaint with the  of the Division of Industrial Relations (DIR), please contact the Workers’ Compensation Section, [address] [facility], [address], [place], [place] [address], telephone [phone], or [address] [facility], [facility] [address], [place], [place] [address],  telephone [phone].    For assistance with Workers’ Compensation Issues: you may contact the Office of the Governor Consumer Health Assistance, [address] [place] [address], [facility] [address], [place] [address], Toll Free 1-296.819.8468, Web site: http://[employer].[facility].[email or web address]., E-mail [email or web address].                                                                                                                                                                               __________________________________________________________________                                    _________________            Employee Name / Signature                                                                                                                            Date                                       D-2 (rev. 10/07)

## 2019-01-22 NOTE — ED PROVIDER NOTES
"ED Provider Note    ER PROVIDER NOTE          CHIEF COMPLAINT  Chief Complaint   Patient presents with   • Foreign Body     R hand, nail through midpalm       HPI  Pacheco Hdz is a 23 y.o. male who presents to the emergency department complaining of right hand pain.  Patient was at work when he put a nail through his hand with a nail gun.  He denies any other injury.  He states his hand feels tingly although no other focal weakness numbness or tingling.  He denies any recent fevers chills.  No chest pain or shortness of breath    REVIEW OF SYSTEMS  Pertinent positives include hand pain. Pertinent negatives include no weakness. See HPI for details. All other systems reviewed and are negative.    PAST MEDICAL HISTORY   none    SURGICAL HISTORY  patient denies any surgical history    FAMILY HISTORY  No family history on file.    SOCIAL HISTORY  Social History     Social History   • Marital status: Single     Spouse name: N/A   • Number of children: N/A   • Years of education: N/A     Social History Main Topics   • Smoking status: Never Smoker   • Smokeless tobacco: Never Used   • Alcohol use Not on file   • Drug use: Unknown   • Sexual activity: Not on file     Other Topics Concern   • Not on file     Social History Narrative   • No narrative on file      History   Drug use: Unknown       CURRENT MEDICATIONS  Home Medications    **Home medications have not yet been reviewed for this encounter**         ALLERGIES  No Known Allergies    PHYSICAL EXAM  VITAL SIGNS: /80   Pulse 78   Temp 36.9 °C (98.4 °F) (Temporal)   Resp 16   Ht 1.702 m (5' 7\")   Wt 96.3 kg (212 lb 4.9 oz)   SpO2 94%   BMI 33.25 kg/m²   Pulse ox interpretation: I interpret this pulse ox as normal.    Constitutional: Alert uncomfortable  HENT: No signs of trauma, Bilateral external ears normal, Nose normal.   Eyes: Pupils are equal and reactive, Conjunctiva normal, Non-icteric.   Neck: Normal range of motion, No tenderness, Supple, No " stridor.   Cardiovascular: Regular rate and rhythm, no murmurs.   Thorax & Lungs: Normal breath sounds, No respiratory distress, No wheezing, No chest tenderness.   Abdomen: Bowel sounds normal, Soft, No tenderness, No masses, No pulsatile masses. No peritoneal signs.  Skin: Warm, Dry, No erythema, No rash.   Back: No bony tenderness, No CVA tenderness.   Extremities: 8 cm nail protruding with point on dorsal surface between digits 2 and 3, unable to visualize head as it is buried in the palmar surface, distal capillary refill less than 2 seconds, distal sensation intact light touch intact distal pulses, No cyanosis, unable to assess tendon function due to foreign body    Neurologic: Alert , Normal motor function, Normal sensory function, No focal deficits noted.   Psychiatric: Affect normal, Judgment normal, Mood normal.     DIAGNOSTIC STUDIES / PROCEDURES            RADIOLOGY  DX-HAND 3+ RIGHT   Final Result      Interval removal of a metallic nail in the soft tissues between the second and third metacarpals. There is a small amount of gas within the soft tissues. No fracture.            DX-HAND 3+ RIGHT   Final Result      And 8.6 cm nail extending through the second intermetacarpal space anterior to posterior. No acute fracture or dislocation.        The radiologist's interpretation of all radiological studies have been reviewed by me.    COURSE & MEDICAL DECISION MAKING  Nursing notes, VS, PMSFHx reviewed in chart.    12:23 PM Patient seen and examined at bedside. Patient will be treated with lidocaine, fentanyl. Ordered for x-ray to evaluate his symptoms.     1:14 PM attempted to push distal portion of nail back through as the head of the nail is unable to be visualized.  It appears to be quite large and patient in significant discomfort even with 150 mcg of fentanyl and local anesthesia    Will plan for procedural sedation for removal    1338 sedation started  Conscious Sedation Procedure Note    Indication:  Foreign body removal    Consent: I have discussed with the patient and/or the patient representative the indication, alternatives, and the possible risks and/or complications of the planned procedure and the anesthesia methods. The patient and/or patient representative appear to understand and agree to proceed.    Pre-Sedation Documentation and Exam: I have personally completed a history, physical exam & review of systems for this patient (see notes).  Airway Assessment: normal    Prior History of Anesthesia Complications: none    ASA Classification: Class 1 - A normal healthy patient    Sedation/ Anesthesia Plan: intravenous sedation    Medications Used: propofol intravenously    Monitoring and Safety: The patient was placed on a cardiac monitor and vital signs, pulse oximetry and level of consciousness were continuously evaluated throughout the procedure. The patient was closely monitored until recovery from the medications was complete and the patient had returned to baseline status. Respiratory therapy was on standby at all times during the procedure.    (The following sections must be completed)  Post-Sedation Vital Signs: Vital signs were reviewed and were stable after the procedure (see flow sheet for vitals)            Post-Sedation Exam: Lungs: clear and Cardiovascular: normal      Procedure: Foreign body removal  Under procedural sedation as above the distal/volar aspect of the nail was grasped with pliers.  Push the head back through on the palmar side.  Skin incision was slightly extended to allow for passage, 2 mm.  Once the head of the nail was through the skin the nail was pulled from the palmar side without any resistance.  Patient tolerated well under sedation.    Wound irrigated, cleaned, dressed    Postprocedural x-ray done straight no retained foreign body or fx    Sedation ended 14:13    3:01 PM  Patient reevaluated, comfortable at this time.  Patient is able to make a claw  and a loose  fist, he is able to flex 2 and 3 independently at MCP PIP and DIP although his movement is limited secondary to pain          Decision Making:  This is a 23 y.o. male presenting after having a nail go through his hand with a nail gun at work.  This was embedded in the soft tissue and required procedural sedation as above to remove.  After removal there is no evidence of fracture or retained foreign body.  Patient does appear to have full movement of his hand although his exam is limited to pain for full evaluation of function/tendon function at this time.  He has been given Ancef, will be started on Keflex, Percocet for pain and educated on wound care.  He will follow-up in the occupational health clinic in 2 days for reevaluation of his wound as well as for tendon function as he may need to see specialist if he has any issues with this    I reviewed prescription monitoring program for patient's narcotic use before prescribing a scheduled drug.The patient will not drink alcohol nor drive with prescribed medications The patient will return for new or worsening symptoms and is stable at the time of discharge.    The patient is referred to a primary physician for blood pressure management, diabetic screening, and for all other preventative health concerns.    In prescribing controlled substances to this patient, I certify that I have obtained and reviewed the medical history of Pacheco Hdz. I have also made a good adrian effort to obtain applicable records from other providers who have treated the patient and records did not demonstrate any increased risk of substance abuse that would prevent me from prescribing controlled substances.     I have conducted a physical exam and documented it. I have reviewed Mr. Hdz’s prescription history as maintained by the [place] Prescription Monitoring Program.     I have assessed the patient’s risk for abuse, dependency, and addiction using the validated Opioid Risk Tool  available at https://[email or web address]/xzrtfu-ucwp-iazf-ort-narcotic-abuse.     Given the above, I believe the benefits of controlled substance therapy outweigh the risks. The reasons for prescribing controlled substances include non-narcotic, oral analgesic alternatives have been inadequate for pain control. Accordingly, I have discussed the risk and benefits, treatment plan, and alternative therapies with the patient.         DISPOSITION:  Patient will be discharged home in stable condition.    FOLLOW UP:  [address] [address]  [phone]    In 2 days  Call tomorrow for appointment in 2 days      OUTPATIENT MEDICATIONS:  New Prescriptions    CEPHALEXIN (KEFLEX) 500 MG CAP    Take 1 Cap by mouth 4 times a day for 7 days.    OXYCODONE-ACETAMINOPHEN (PERCOCET) 5-325 MG TAB    Take 1-2 Tabs by mouth every 6 hours as needed for Moderate Pain for up to 3 days.         FINAL IMPRESSION  1. Foreign body (FB) in soft tissue    2. Puncture wound         The note accurately reflects work and decisions made by me.  Boris Benitez  1/22/2019  5:09 PM

## 2019-01-23 NOTE — ED NOTES
VSS.  Discharge instructions and prescriptions x 3 given, Instructed to not drink alcohol, drive, or take tylenol while taking narcotic pain medication.  - verbalizes understanding.   Ambulatory to lobby with steady gait.

## 2019-01-24 NOTE — DISCHARGE PLANNING
Pt's girlfriend wanted to know which hand specialist her boyfriend should see. Girlfriend said pt did not speak English and requested her to telephoned on his behalf.     Advised girlfriend to contact pt's employer to determine which hand specialist he can see as his injury was covered under workman's comp.     Girlfriend also wanted to provide correct telephone number for workman's comp. Advised the call would be transferred to phone number can be changed. Call transferred to [facility].

## 2019-01-28 ENCOUNTER — OCCUPATIONAL MEDICINE (OUTPATIENT)
Dept: URGENT CARE | Facility: CLINIC | Age: 24
End: 2019-01-28
Payer: OTHER MISCELLANEOUS

## 2019-01-28 VITALS
DIASTOLIC BLOOD PRESSURE: 76 MMHG | OXYGEN SATURATION: 94 % | BODY MASS INDEX: 33.27 KG/M2 | SYSTOLIC BLOOD PRESSURE: 116 MMHG | RESPIRATION RATE: 12 BRPM | HEIGHT: 67 IN | TEMPERATURE: 98.1 F | WEIGHT: 212 LBS | HEART RATE: 77 BPM

## 2019-01-28 DIAGNOSIS — S61.441D: ICD-10-CM

## 2019-01-28 PROCEDURE — 99213 OFFICE O/P EST LOW 20 MIN: CPT | Mod: 29 | Performed by: PHYSICIAN ASSISTANT

## 2019-01-28 RX ORDER — OXYCODONE HYDROCHLORIDE AND ACETAMINOPHEN 5; 325 MG/1; MG/1
1-2 TABLET ORAL EVERY 4 HOURS PRN
COMMUNITY

## 2019-01-28 ASSESSMENT — ENCOUNTER SYMPTOMS
CHILLS: 0
FEVER: 0

## 2019-01-28 NOTE — LETTER
[facility] [place] [id number] [facility] [address] [place] [address]  Phone:  [phone] - Fax:  [phone]   [facility] Progress Report and Disability Certification  Date of Service: 1/28/2019   No Show:  No  Date / Time of Next Visit: 2/4/2019 Has appt with [employer] on 2/1/19 at 3:15 PM   Claim Information   Patient Name: Pacheco Hdz  Claim Number:     Employer: [employer]  Date of Injury: 1/22/2019     Insurer / TPA: [employer]  ID / SSN:     Occupation:   Diagnosis: The encounter diagnosis was Puncture wound of right hand with foreign body, subsequent encounter.    Medical Information   Related to Industrial Injury? Yes    Subjective Complaints:  Patient presents today 6 days s/p nail puncture wound to right palm. He states that he is still having 8/10 pain constantly and swelling and is having difficulty moving his fingers. He has been changing dressings daily and has continued taking prescribed antibiotic. He tried to make UC appointment after discharge from ED, but due to miscommunication, was unable to come in until today.    Objective Findings: Constitutional: He is oriented to person, place, and time. He appears well-developed and well-nourished. No distress.   HENT:   Head: Normocephalic and atraumatic.   Eyes: Conjunctivae and EOM are normal.   Neck: Normal range of motion. No tracheal deviation present.   Cardiovascular: Normal rate and regular rhythm.    Pulses:       Radial pulses are 2+ on the right side, and 2+ on the left side.   Pulmonary/Chest: Effort normal. No respiratory distress.   Musculoskeletal:        Right hand: He exhibits decreased range of motion, tenderness, laceration and swelling. He exhibits no bony tenderness, normal capillary refill and no deformity. Decreased sensation noted. Decreased sensation is present in the radial distribution. Decreased strength noted. He exhibits finger abduction, thumb/finger opposition and wrist  extension trouble.   Right hand with healing clean puncture wound in palm. No redness, erythema, or discharge. Patient has difficulty flexing fingers due to pain. He states that he feels like he cannot control his right middle finger fully. He is also having difficulty flexing and extending wrist, but states that it is not due to pain.    Neurological: He is alert and oriented to person, place, and time. A sensory deficit is present.   Skin: Skin is warm and dry. Capillary refill takes less than 2 seconds.   Psychiatric: He has a normal mood and affect. His behavior is normal. Judgment and thought content normal.    Pre-Existing Condition(s):     Assessment:   Condition Improved    Status: Additional Care Required  Permanent Disability:No    Plan:   Comments:OTC ibuprofen/acetaminophen as needed. Referral for orthopedics.     Diagnostics: X-ray    Comments:       Disability Information   Status: Released to Restricted Duty    From:  2019  Through: 2019 Restrictions are: Temporary   Physical Restrictions   Sitting:    Standing:    Stooping:    Bending:      Squatting:    Walking:    Climbing:    Pushing:      Pulling:    Other:    Reaching Above Shoulder (L):   Reaching Above Shoulder (R):       Reaching Below Shoulder (L):    Reaching Below Shoulder (R):      Not to exceed Weight Limits   Carrying(hrs):   Weight Limit(lb): < or = to 10 pounds Lifting(hrs):   Weight  Limit(lb): < or = to 10 pounds   Comments:      Repetitive Actions   Hands: i.e. Fine Manipulations from [place] hrs/day   Feet: i.e. Operating Foot Controls:     Driving / Operate Machinery:     Physician Name: Yan Paniagua P.A.-C. Physician Signature: YAN Boyle P.A.-C. e-Signature: Dr. Fady Roth, Medical Director   Clinic Name / Location: [address] [place] [address] Clinic Phone Number: Dept: [phone]   Appointment Time: 2:45 Pm Visit Start Time: 3:43 PM   Check-In  Time:  2:43 Pm Visit Discharge Time:  5 PM   Original-Treating Physician or Chiropractor    Page 2-Insurer/TPA    Page 3-Employer    Page 4-Employee

## 2019-01-29 NOTE — PROGRESS NOTES
"Subjective:   Pachceo Hdz is a 23 y.o. male who presents for Follow-Up (WC Rt hand nail went threw hand. Pt states it is swollen and painfull )    DOI 1/22/19  Patient went to ER after putting a nail through his right hand with a nail gun while at work. Nail was removed in ED and x-ray showed only soft tissue damage. Patient was given Percocet and Keflex and told to follow up with urgent care.     Patient presents today 6 days s/p nail puncture wound to right palm. He states that he is still having 8/10 pain constantly and swelling and is having difficulty moving his fingers. He has been changing dressings daily and has continued taking prescribed antibiotic. He tried to make UC appointment after discharge from ED, but due to miscommunication, was unable to come in until today.      Review of Systems   Constitutional: Negative for chills and fever.   Musculoskeletal:        Positive for right hand pain, numbness   All other systems reviewed and are negative.      PMH:  has no past medical history of Diabetes (HCC).  MEDS:   Current Outpatient Prescriptions:   •  oxyCODONE-acetaminophen (PERCOCET) 5-325 MG Tab, Take 1-2 Tabs by mouth every four hours as needed., Disp: , Rfl:   •  cephALEXin (KEFLEX) 500 MG Cap, Take 1 Cap by mouth 4 times a day for 7 days., Disp: 28 Cap, Rfl: 0  ALLERGIES: No Known Allergies  SURGHX: History reviewed. No pertinent surgical history.  SOCHX:  reports that he has never smoked. He has never used smokeless tobacco. He reports that he does not drink alcohol or use drugs.  FH: Reviewed with patient, not pertinent to this visit.     Objective:   /76   Pulse 77   Temp 36.7 °C (98.1 °F)   Resp 12   Ht 1.702 m (5' 7\")   Wt 96.2 kg (212 lb)   SpO2 94%   BMI 33.20 kg/m²   Physical Exam   Constitutional: He is oriented to person, place, and time. He appears well-developed and well-nourished. No distress.   HENT:   Head: Normocephalic and atraumatic.   Eyes: Conjunctivae and EOM " are normal.   Neck: Normal range of motion. No tracheal deviation present.   Cardiovascular: Normal rate and regular rhythm.    Pulses:       Radial pulses are 2+ on the right side, and 2+ on the left side.   Pulmonary/Chest: Effort normal. No respiratory distress.   Musculoskeletal:        Right hand: He exhibits decreased range of motion, tenderness, laceration and swelling. He exhibits no bony tenderness, normal capillary refill and no deformity. Decreased sensation noted. Decreased sensation is present in the radial distribution. Decreased strength noted. He exhibits finger abduction, thumb/finger opposition and wrist extension trouble.   Right hand with healing clean puncture wound in palm. No redness, erythema, or discharge. Patient has difficulty flexing fingers due to pain. He states that he feels like he cannot control his right middle finger fully. He is also having difficulty flexing and extending wrist, but states that it is not due to pain.    Neurological: He is alert and oriented to person, place, and time. A sensory deficit is present.   Patient states that his right index and middle fingers are numb.   Skin: Skin is warm and dry. Capillary refill takes less than 2 seconds.   Psychiatric: He has a normal mood and affect. His behavior is normal. Judgment and thought content normal.       Assessment/Plan:   1. Puncture wound of right hand with foreign body, subsequent encounter  - REFERRAL TO ORTHOPEDICS    Other orders  - oxyCODONE-acetaminophen (PERCOCET) 5-325 MG Tab; Take 1-2 Tabs by mouth every four hours as needed.    - Advised to continue and complete Keflex course  - Advised to take OTC ibuprofen/acetaminophen as needed for pain  - Advised to continue keeping area clean and dry, additional dressing supplies provided  - Work restrictions given including no fine hand movements, lifting limited to < 10lbs.   - Advised to follow up with orthopedics due to paresthesias and limited  ROM    Differential diagnosis, natural history, supportive care, and indications for immediate follow-up discussed.